# Patient Record
Sex: FEMALE | Race: WHITE
[De-identification: names, ages, dates, MRNs, and addresses within clinical notes are randomized per-mention and may not be internally consistent; named-entity substitution may affect disease eponyms.]

---

## 2020-07-17 ENCOUNTER — HOSPITAL ENCOUNTER (OUTPATIENT)
Dept: HOSPITAL 75 - ER | Age: 60
Setting detail: OBSERVATION
LOS: 1 days | Discharge: HOME | End: 2020-07-18
Attending: FAMILY MEDICINE | Admitting: FAMILY MEDICINE
Payer: COMMERCIAL

## 2020-07-17 VITALS — SYSTOLIC BLOOD PRESSURE: 174 MMHG | DIASTOLIC BLOOD PRESSURE: 97 MMHG

## 2020-07-17 VITALS — SYSTOLIC BLOOD PRESSURE: 150 MMHG | DIASTOLIC BLOOD PRESSURE: 95 MMHG

## 2020-07-17 VITALS — WEIGHT: 158.73 LBS | BODY MASS INDEX: 24.91 KG/M2 | HEIGHT: 66.93 IN

## 2020-07-17 VITALS — DIASTOLIC BLOOD PRESSURE: 88 MMHG | SYSTOLIC BLOOD PRESSURE: 146 MMHG

## 2020-07-17 DIAGNOSIS — I24.9: ICD-10-CM

## 2020-07-17 DIAGNOSIS — J18.9: ICD-10-CM

## 2020-07-17 DIAGNOSIS — Z20.828: ICD-10-CM

## 2020-07-17 DIAGNOSIS — R07.1: Primary | ICD-10-CM

## 2020-07-17 DIAGNOSIS — J44.9: ICD-10-CM

## 2020-07-17 DIAGNOSIS — I10: ICD-10-CM

## 2020-07-17 DIAGNOSIS — Z90.710: ICD-10-CM

## 2020-07-17 DIAGNOSIS — F17.210: ICD-10-CM

## 2020-07-17 DIAGNOSIS — Z79.51: ICD-10-CM

## 2020-07-17 LAB
ALBUMIN SERPL-MCNC: 4.4 GM/DL (ref 3.2–4.5)
ALP SERPL-CCNC: 102 U/L (ref 40–136)
ALT SERPL-CCNC: 23 U/L (ref 0–55)
APTT BLD: 28 SEC (ref 24–35)
BASOPHILS # BLD AUTO: 0 10^3/UL (ref 0–0.1)
BASOPHILS NFR BLD AUTO: 1 % (ref 0–10)
BILIRUB SERPL-MCNC: 0.6 MG/DL (ref 0.1–1)
BUN/CREAT SERPL: 16
CALCIUM SERPL-MCNC: 9.1 MG/DL (ref 8.5–10.1)
CHLORIDE SERPL-SCNC: 102 MMOL/L (ref 98–107)
CO2 SERPL-SCNC: 22 MMOL/L (ref 21–32)
CREAT SERPL-MCNC: 0.82 MG/DL (ref 0.6–1.3)
D DIMER PPP FEU-MCNC: 0.36 UG/ML (ref 0–0.49)
EOSINOPHIL # BLD AUTO: 0.3 10^3/UL (ref 0–0.3)
EOSINOPHIL NFR BLD AUTO: 5 % (ref 0–10)
ERYTHROCYTE [DISTWIDTH] IN BLOOD BY AUTOMATED COUNT: 13.4 % (ref 10–14.5)
GFR SERPLBLD BASED ON 1.73 SQ M-ARVRAT: > 60 ML/MIN
GLUCOSE SERPL-MCNC: 106 MG/DL (ref 70–105)
HCT VFR BLD CALC: 43 % (ref 35–52)
HGB BLD-MCNC: 15 G/DL (ref 11.5–16)
INR PPP: 0.9 (ref 0.8–1.4)
LYMPHOCYTES # BLD AUTO: 1.9 X 10^3 (ref 1–4)
LYMPHOCYTES NFR BLD AUTO: 30 % (ref 12–44)
MAGNESIUM SERPL-MCNC: 2.2 MG/DL (ref 1.6–2.4)
MANUAL DIFFERENTIAL PERFORMED BLD QL: NO
MCH RBC QN AUTO: 33 PG (ref 25–34)
MCHC RBC AUTO-ENTMCNC: 35 G/DL (ref 32–36)
MCV RBC AUTO: 95 FL (ref 80–99)
MONOCYTES # BLD AUTO: 0.8 X 10^3 (ref 0–1)
MONOCYTES NFR BLD AUTO: 12 % (ref 0–12)
NEUTROPHILS # BLD AUTO: 3.4 X 10^3 (ref 1.8–7.8)
NEUTROPHILS NFR BLD AUTO: 53 % (ref 42–75)
PLATELET # BLD: 258 10^3/UL (ref 130–400)
PMV BLD AUTO: 10.3 FL (ref 7.4–10.4)
POTASSIUM SERPL-SCNC: 4.2 MMOL/L (ref 3.6–5)
PROT SERPL-MCNC: 8.1 GM/DL (ref 6.4–8.2)
PROTHROMBIN TIME: 12.6 SEC (ref 12.2–14.7)
SODIUM SERPL-SCNC: 136 MMOL/L (ref 135–145)
WBC # BLD AUTO: 6.4 10^3/UL (ref 4.3–11)

## 2020-07-17 PROCEDURE — 36415 COLL VENOUS BLD VENIPUNCTURE: CPT

## 2020-07-17 PROCEDURE — 93041 RHYTHM ECG TRACING: CPT

## 2020-07-17 PROCEDURE — 93306 TTE W/DOPPLER COMPLETE: CPT

## 2020-07-17 PROCEDURE — 85379 FIBRIN DEGRADATION QUANT: CPT

## 2020-07-17 PROCEDURE — 87040 BLOOD CULTURE FOR BACTERIA: CPT

## 2020-07-17 PROCEDURE — 80053 COMPREHEN METABOLIC PANEL: CPT

## 2020-07-17 PROCEDURE — 93005 ELECTROCARDIOGRAM TRACING: CPT

## 2020-07-17 PROCEDURE — 94760 N-INVAS EAR/PLS OXIMETRY 1: CPT

## 2020-07-17 PROCEDURE — 99284 EMERGENCY DEPT VISIT MOD MDM: CPT

## 2020-07-17 PROCEDURE — 94664 DEMO&/EVAL PT USE INHALER: CPT

## 2020-07-17 PROCEDURE — 80061 LIPID PANEL: CPT

## 2020-07-17 PROCEDURE — 84484 ASSAY OF TROPONIN QUANT: CPT

## 2020-07-17 PROCEDURE — 83735 ASSAY OF MAGNESIUM: CPT

## 2020-07-17 PROCEDURE — 71046 X-RAY EXAM CHEST 2 VIEWS: CPT

## 2020-07-17 PROCEDURE — 71045 X-RAY EXAM CHEST 1 VIEW: CPT

## 2020-07-17 PROCEDURE — 83880 ASSAY OF NATRIURETIC PEPTIDE: CPT

## 2020-07-17 PROCEDURE — 82962 GLUCOSE BLOOD TEST: CPT

## 2020-07-17 PROCEDURE — 85730 THROMBOPLASTIN TIME PARTIAL: CPT

## 2020-07-17 PROCEDURE — 94640 AIRWAY INHALATION TREATMENT: CPT

## 2020-07-17 PROCEDURE — 85610 PROTHROMBIN TIME: CPT

## 2020-07-17 PROCEDURE — 85025 COMPLETE CBC W/AUTO DIFF WBC: CPT

## 2020-07-17 PROCEDURE — 87635 SARS-COV-2 COVID-19 AMP PRB: CPT

## 2020-07-17 PROCEDURE — 83874 ASSAY OF MYOGLOBIN: CPT

## 2020-07-17 RX ADMIN — NITROGLYCERIN PRN MG: 0.4 TABLET SUBLINGUAL at 10:51

## 2020-07-17 RX ADMIN — NITROGLYCERIN PRN MG: 0.4 TABLET SUBLINGUAL at 10:39

## 2020-07-17 RX ADMIN — ALBUTEROL SULFATE SCH PUFF: 90 AEROSOL, METERED RESPIRATORY (INHALATION) at 23:22

## 2020-07-17 NOTE — DIAGNOSTIC IMAGING REPORT
INDICATION: Shortness of breath and cough.



TIME OF EXAM: 10:45 AM



COMPARISON: No prior studies are available for comparison.



The heart size is normal. Pulmonary vascularity is normal. There

is minimal density in the right base. Possibility of minimal

infiltrate cannot be excluded. Left lung is clear. There is no

effusion. There is no pneumothorax.



IMPRESSION: There may be minimal infiltrate in the right base.

The study is otherwise unremarkable.



Dictated by: 



  Dictated on workstation # [id number]

## 2020-07-17 NOTE — HISTORY & PHYSICAL-HOSPITALIST
History of Present Illness


HPI/Chief Complaint


Pt is a 59yoCF with no known past medical history who presented to the ER due to

SOB, cough, and chest pain. She states that this started about 1 week ago. She 

has no sick contacts or exposure to COVID19. She denies fever. She has developed

a productive cough and has noticed herself wheezing. She does not have a formal 

diagnosis of COPD but has needed inhalers in the past. She is an active smoker 

but hasn't smoked for the past week due to SOB. She is also JAMES.


Source:  patient


Date Seen


20


Time Seen by a Provider:  14:09


Attending Physician


Pebbles Ramírez MD


PCP


No,Local Physician


Referring Physician





Date of Admission


2020 at 12:25





Home Medications & Allergies


Home Medications


Reviewed patient Home Medication Reconciliation performed by pharmacy medication

reconciliations technician and/or nursing.


Patients Allergies have been reviewed.





Allergies





Allergies


Coded Allergies


  No Known Drug Allergies (Unverified20)








Past Medical-Social-Family Hx


Past Med/Social Hx:  Reviewed Nursing Past Med/Soc Hx


Patient Social History


Alcohol Use:  Occasionally Uses


Recreational Drug Use:  No


Smoking Status:  Current Everyday Smoker


Recent Foreign Travel:  No


Contact w/other who traveled:  No


Recent Hopitalizations:  No


Recent Infectious Disease Expo:  No





Seasonal Allergies


Seasonal Allergies:  No





Past Medical History


Surgeries:  Hysterectomy





Family History


Reviewed Nursing Family Hx


No Pertinent Family Hx





Review of Systems


Constitutional:  No chills, No fever


EENTM:  no symptoms reported


Respiratory:  see HPI, cough, dyspnea on exertion, phlegm, short of breath


Cardiovascular:  chest pain; No edema, No Hx of Intervention, No palpitations


Gastrointestinal:  no symptoms reported


Genitourinary:  no symptoms reported


Musculoskeletal:  no symptoms reported


Skin:  no symptoms reported


Psychiatric/Neurological:  No Symptoms Reported





Physical Exam


Physical Exam


Vital Signs





Vital Signs - First Documented








 20





 10:03 14:00


 


Temp 36.7 


 


Pulse 99 


 


Resp 18 


 


B/P (MAP) 174/97 (122) 


 


Pulse Ox 97 


 


O2 Delivery Room Air 


 


FiO2  21





Capillary Refill : Less Than 3 Seconds


Height, Weight, BMI


Height: '"


Weight: lbs. oz. kg; 24.91 BMI


Method:


General Appearance:  No Apparent Distress, WD/WN


HEENT:  PERRL/EOMI, Moist Mucous Membranes; No Scleral Icterus (L), No Scleral 

Icterus (R)


Neck:  Normal Inspection, Supple; No Thyromegaly


Respiratory:  No No Accessory Muscle Use, No No Respiratory Distress; Crackles, 

Wheezing


Cardiovascular:  Regular Rate, Rhythm, No JVD, No Murmur


Gastrointestinal:  Normal Bowel Sounds, Non Tender, Soft


Extremity:  Normal Capillary Refill, No Calf Tenderness, No Pedal Edema


Neurologic/Psychiatric:  Alert, Oriented x3, Normal Mood/Affect


Skin:  Normal Color, Warm/Dry





Results


Results/Procedures


Labs


Laboratory Tests


20 10:20








Patient resulted labs reviewed.


Imaging:  Reviewed Imaging Report


Imaging


                 [facility], [place]





NAME:   BETY MIRELES


[facility] REC#:   [id number]


ACCOUNT#:   [id number]


PT STATUS:   REG ER


:   1960


PHYSICIAN:   SRIKANTH COX MD


ADMIT DATE:   20/ER


                                   ***Draft***


Date of Exam:20





CHEST 1 VIEW, AP/PA ONLY








INDICATION: Shortness of breath and cough.





TIME OF EXAM: 10:45 AM





COMPARISON: No prior studies are available for comparison.





The heart size is normal. Pulmonary vascularity is normal. There


is minimal density in the right base. Possibility of minimal


infiltrate cannot be excluded. Left lung is clear. There is no


effusion. There is no pneumothorax.





IMPRESSION: There may be minimal infiltrate in the right base.


The study is otherwise unremarkable.





  Dictated on workstation # [id number]








Dict:   20 1101


Trans:   20 1105


[facility] [id number]





Interpreted by:     CHRISTINA ELIZABETH MD


Electronically signed by:





Assessment/Plan


Admission Diagnosis


Chest pain


Admission Status:  Observation





Assessment and Plan


Chest pain


   Troponin negative on arrival as well as EKG


   Cardiology consulted, appreciate assistance


   Trend troponins


   ASA 





Likely COPD exacerbation


Pneumonia- community acquired


   On room air


   Start on Prednisone due to wheezing


   Rocephin and Azithro for CAP


   MAT protocol


   COVID pending


   


HTN


   Lisinopril started





Diagnosis/Problems


Diagnosis/Problems





(1) Smoker


Status:  Chronic


(2) COPD suggested by initial evaluation


Status:  Acute


(3) HTN (hypertension)


Status:  Acute


Qualifiers:  


   Hypertension type:  essential hypertension  Qualified Codes:  I10 - Essential

(primary) hypertension


(4) Chest pain


Status:  Acute


Qualifiers:  


   Chest pain type:  chest pain on breathing  Qualified Codes:  R07.1 - Chest pa

in on breathing


(5) Pneumonia


Status:  Acute


Qualifiers:  


   Pneumonia type:  due to unspecified organism  Laterality:  right  Lung 

location:  lower lobe of lung  Qualified Codes:  J18.1 - Lobar pneumonia, 

unspecified organism





Clinical Quality Measures


DVT/VTE Risk/Contraindication:


Risk Factor Score Per Nursing:  3


RFS Level Per Nursing on Admit:  3=High











PEBBLES RAMÍREZ MD              2020 14:09

## 2020-07-17 NOTE — ED CHEST PAIN
General


Chief Complaint:  Respiratory Problems


Stated Complaint:  SOB;COUGH


Nursing Triage Note:  


ARRIVED VIA AMB TO COVID.  COMPLAINS OF SOA, COUGH X1 WEEK.  ALSO STATES HER 


LEFT CHEST HURTS BUT THINKS IT IS RELATED TO THE COUGHING.


Nursing Sepsis Screen:  No Definite Risk


Source:  patient


Exam Limitations:  no limitations





History of Present Illness


Date Seen by Provider:  2020


Time Seen by Provider:  10:20


Initial Comments


Patient presents ER by private conveyance with chief complaint of the past week 

having some shortness of breath, nonproductive cough. She smokes about a pack 

cigarettes per day. She's been told the past at urgent care she may have COPD 

but she does not follow with a primary care doctor. She's also told she might 

have high blood pressure. No known history of heart disease. She's having some 

chest pain off and on in her left upper chest radiating through to her back. It 

radiates to her axilla but not to her arm or jaw. She has not taken any routine 

medications. She says her mom and sister both had heart disease early and her 

mom had to have a CABG in her early 50s. She's not having any fever or chills. 

No nausea vomiting or sweats.





Allergies and Home Medications


Allergies


Coded Allergies:  


     No Known Drug Allergies (Unverified , 20)





Home Medications


Ibuprofen 200 Mg Capsule, 800 MG PO Q8H PRN for PAIN-MILD (1-4), (Reported)





Patient Home Medication List


Home Medication List Reviewed:  Yes





Review of Systems


Review of Systems


Constitutional:  No chills, No diaphoresis


EENTM:  No Blurred Vision, No Double Vision


Respiratory:  Cough, Shortness of Air, SOA at Rest


Cardiovascular:  Chest Pain; Denies Edema, Denies Palpitations, Denies Syncope


Gastrointestinal:  Denies Constipated, Denies Diarrhea, Denies Nausea


Genitourinary:  Denies Burning, Denies Discharge


Musculoskeletal:  No back pain, No joint pain


Skin:  No pruritus, No rash


Psychiatric/Neurological:  Headache; Denies Numbness, Denies Paresthesia





All Other Systems Reviewed


Negative Unless Noted:  Yes





Past Medical-Social-Family Hx


Patient Social History


Alcohol Use:  Occasionally Uses


Recreational Drug Use:  No


Smoking Status:  Current Everyday Smoker


Recent Foreign Travel:  No


Contact w/Someone Who Travel:  No


Recent Infectious Disease Expo:  No


Recent Hopitalizations:  No





Seasonal Allergies


Seasonal Allergies:  No





Past Medical History


Surgeries:  Yes


Hysterectomy


Respiratory:  Yes (THINKS SHE HAS UNDIAGNOSED COPD/EMPHYSEMA)


Cardiac:  No


Neurological:  No


Genitourinary:  No


Gastrointestinal:  No


Endocrine:  No


HEENT:  No


Cancer:  No


Psychosocial:  No


Integumentary:  No





Physical Exam


Vital Signs





Vital Signs - First Documented








 20





 10:03


 


Temp 36.7


 


Pulse 99


 


Resp 18


 


B/P (MAP) 174/97 (122)


 


Pulse Ox 97


 


O2 Delivery Room Air





Capillary Refill : Less Than 3 Seconds


Height, Weight, BMI


Height: '"


Weight: lbs. oz. kg; 24.00 BMI


Method:


General Appearance:  WD/WN, Mild Distress


HEENT:  PERRL/EOMI, Pharynx Normal, Moist Mucous Membranes


Neck:  Full Range of Motion, Normal Inspection


Respiratory:  Lungs Clear, Normal Breath Sounds, No Accessory Muscle Use, No 

Respiratory Distress


Cardiovascular:  Regular Rate, Rhythm, No Edema, Normal Peripheral Pulses


Gastrointestinal:  Normal Bowel Sounds, Non Tender, Soft


Extremity:  Normal Capillary Refill, Normal Inspection


Neurologic/Psychiatric:  Alert, Oriented x3


Skin:  Normal Color, Warm/Dry





Focused Exam


Sepsis Stage:  Ruled Out


Reason for ruling out sepsis:  does not meet SIRS criteria.


Possible Source:  Pulmonary





Progress/Results/Core Measures


Results/Orders


Lab Results





Laboratory Tests








Test


 20


10:20 20


12:00 Range/Units


 


 


White Blood Count


 6.4 


 


 4.3-11.0


10^3/uL


 


Red Blood Count


 4.55 


 


 4.35-5.85


10^6/uL


 


Hemoglobin 15.0   11.5-16.0  G/DL


 


Hematocrit 43   35-52  %


 


Mean Corpuscular Volume 95   80-99  FL


 


Mean Corpuscular Hemoglobin 33   25-34  PG


 


Mean Corpuscular Hemoglobin


Concent 35 


 


 32-36  G/DL





 


Red Cell Distribution Width 13.4   10.0-14.5  %


 


Platelet Count


 258 


 


 130-400


10^3/uL


 


Mean Platelet Volume 10.3   7.4-10.4  FL


 


Neutrophils (%) (Auto) 53   42-75  %


 


Lymphocytes (%) (Auto) 30   12-44  %


 


Monocytes (%) (Auto) 12   0-12  %


 


Eosinophils (%) (Auto) 5   0-10  %


 


Basophils (%) (Auto) 1   0-10  %


 


Neutrophils # (Auto) 3.4   1.8-7.8  X 10^3


 


Lymphocytes # (Auto) 1.9   1.0-4.0  X 10^3


 


Monocytes # (Auto) 0.8   0.0-1.0  X 10^3


 


Eosinophils # (Auto)


 0.3 


 


 0.0-0.3


10^3/uL


 


Basophils # (Auto)


 0.0 


 


 0.0-0.1


10^3/uL


 


Prothrombin Time 12.6   12.2-14.7  SEC


 


INR Comment 0.9   0.8-1.4  


 


Activated Partial


Thromboplast Time 28 


 


 24-35  SEC





 


D-Dimer


 0.36 


 


 0.00-0.49


UG/ML


 


Sodium Level 136   135-145  MMOL/L


 


Potassium Level 4.2   3.6-5.0  MMOL/L


 


Chloride Level 102     MMOL/L


 


Carbon Dioxide Level 22   21-32  MMOL/L


 


Anion Gap 12   5-14  MMOL/L


 


Blood Urea Nitrogen 13   7-18  MG/DL


 


Creatinine


 0.82 


 


 0.60-1.30


MG/DL


 


Estimat Glomerular Filtration


Rate > 60 


 


  





 


BUN/Creatinine Ratio 16    


 


Glucose Level 106 H    MG/DL


 


Calcium Level 9.1   8.5-10.1  MG/DL


 


Corrected Calcium 8.8   8.5-10.1  MG/DL


 


Magnesium Level 2.2   1.6-2.4  MG/DL


 


Total Bilirubin 0.6   0.1-1.0  MG/DL


 


Aspartate Amino Transf


(AST/SGOT) 23 


 


 5-34  U/L





 


Alanine Aminotransferase


(ALT/SGPT) 23 


 


 0-55  U/L





 


Alkaline Phosphatase 102     U/L


 


Myoglobin


 47.1 


 


 10.0-92.0


NG/ML


 


Troponin I < 0.028   <0.028  NG/ML


 


B-Type Natriuretic Peptide 49.2   <100.0  PG/ML


 


Total Protein 8.1   6.4-8.2  GM/DL


 


Albumin 4.4   3.2-4.5  GM/DL








My Orders





Orders - SRIKANTH COX


Cbc With Automated Diff (20 10:31)


Magnesium (20 10:31)


Chest 1 View, Ap/Pa Only (20 10:31)


Ekg Tracing (20 10:31)


Comprehensive Metabolic Panel (20 10:31)


Myoglobin Serum (20 10:31)


Protime With Inr (20 10:31)


Partial Thromboplastin Time (20 10:31)


O2 (20 10:31)


Monitor-Rhythm Ecg Trace Only (20 10:31)


Lipid Panel (20 06:00)


Ed Iv/Invasive Line Start (20 10:31)


BNP (20 10:31)


Troponin I (20 10:31)


Nitroglycerin 0.4 Mg Btl 25's (Nitrostat (20 10:45)


Aspirin Chewable Tablet (Baby Aspirin Ch (20 10:45)


Coronavirus Sars-Cov-2 So  (20 10:31)


Fibrin Degradation Products (20 10:20)


Ceftriaxone  For Iv Use (Rocephin  For I (20 12:15)


Azithromycin Tablet (Zithromax Tablet) (20 12:15)


Blood Culture (20 12:10)





Medications Given in ED





Current Medications








 Medications  Dose


 Ordered  Sig/Vin


 Route  Start Time


 Stop Time Status Last Admin


Dose Admin


 


 Aspirin  324 mg  ONCE  ONCE


 PO  20 10:45


 20 10:46 DC 20 10:39


324 MG


 


 Azithromycin  500 mg  ONCE  ONCE


 PO  20 12:15


 20 12:16 DC 20 12:21


500 MG


 


 Ceftriaxone


 Sodium 1000 mg/


 Sterile Water  10 ml @ 


 200 mls/hr  ONCE  ONCE


 IV  20 12:15


 20 12:17 DC 20 12:20


200 MLS/HR


 


 Nitroglycerin  0.4 mg  UD  PRN


 SL  20 10:45


 20 13:02 DC 20 10:51


0.4 MG








Vital Signs/I&O











 20





 10:03


 


Temp 36.7


 


Pulse 99


 


Resp 18


 


B/P (MAP) 174/97 (122)


 


Pulse Ox 97


 


O2 Delivery Room Air














Blood Pressure Mean:                    122











Progress


Progress Note #1:  


   Time:  10:36


Progress Note


We'll trial aspirin and nitroglycerin. We'll get COVID-19 swab. Even she has a 

negative troponin should have 4 points and the heart score so we would recommend

observation. Presently she rates her pain as a 4 out of 10.


Progress Note #2:  


   Time:  12:01


Progress Note


After discussing the case with the patient she agrees to stay on observation to 

repeat delta troponins. We'll cover her with Rocephin and azithromycin and get 

some blood cultures. She is not septic.


Initial ECG Impression Date:  2020


Initial ECG Impression Time:  10:10


Initial ECG Rate:  89


Initial ECG Rhythm:  Normal Sinus


Initial ECG Intervals:  Normal


Initial ECG Impression:  Normal


Initial ECG Comparisson:  No Previous ECG Available


Comment


Normal sinus rhythm without relevant ST changes.





Diagnostic Imaging





   Diagonstic Imaging:  Xray


   Plain Films/CT/US/NM/MRI:  chest (1v)


Comments


NAME:   BETY MIRELES


[facility] REC#:   [id number]


ACCOUNT#:   [id number]


PT STATUS:   REG ER


:   1960


PHYSICIAN:   SRIKANTH COX MD


ADMIT DATE:   20/ER


                                   ***Draft***


Date of Exam:20





CHEST 1 VIEW, AP/PA ONLY








INDICATION: Shortness of breath and cough.





TIME OF EXAM: 10:45 AM





COMPARISON: No prior studies are available for comparison.





The heart size is normal. Pulmonary vascularity is normal. There


is minimal density in the right base. Possibility of minimal


infiltrate cannot be excluded. Left lung is clear. There is no


effusion. There is no pneumothorax.





IMPRESSION: There may be minimal infiltrate in the right base.


The study is otherwise unremarkable.





  Dictated on workstation # [id number]








Dict:   20 1101


Trans:   20 1105


[facility] [id number]





Interpreted by:     CHRISTINA ELIZABETH MD


Electronically signed by:


   Reviewed:  Reviewed by Me





Departure


Communication (Admissions)


Time/Spoke to Admitting Phy:  12:06


Discussed case with Dr. Ramírez. She agrees to observe the patient with cardiac 

consultation. She agrees with Rocephin and azithromycin.


Time/Spoke to Consulting Phy:  12:05


Discussed case with Dr. Galvan and he agrees to consult on the patient with 

serial troponins.





Impression





   Primary Impression:  


   Pneumonia


   Qualified Codes:  J18.1 - Lobar pneumonia, unspecified organism


   Additional Impressions:  


   Acute coronary syndrome


   COVID-19 PUI


Disposition:   ADMITTED AS INPATIENT


Condition:  Stable





Admissions


Decision to Admit Reason:  Admit from ER (General)


Decision to Admit/Date:  2020


Time/Decision to Admit Time:  11:29











SRIKANTH COX                 2020 10:37

## 2020-07-17 NOTE — NUR
BETY MIRELES S admitted to room 426-1, with an admitting diagnosis of , on 07/17/20 
from ED via , accompanied by STAFF. BETY MIRELES introduced to surroundings, call 
light, bed controls, phone, TV, temperature control, lights, meal times, smoking policy, 
visitor policy, side rail policy, bathrooms and showers.  Patient Rights given to patient in 
the handbook. BETY MIRELES verbalizes understanding that [facility] is not 
responsible for the loss or damage to any personal effects or valuables that are kept in the 
patients posession during their hospitalization.

## 2020-07-17 NOTE — NUR
SPOKE WITH THE PT (I CALLED THE PATIENTS ROOM PHONE) TO COMPLETE THE MED REC



PT DENIES TAKING ANY PRESCRIPTION MEDICATION



OTC MEDS:

IBUPROFEN 200MG PT SAYS SHE TAKES 4 TABS AT A TIME

## 2020-07-17 NOTE — NUR
PT STATES HER PAIN IS ABOUT THE SAME.  DESCRIBES THE PAIN IN HER LEFT CHEST AS 
JUST SORE.  /87.  DENIES NEEDS AT THIS TIME.  PT STATES SHE HAS BEEN 
TALKING TO HER FAMILY ON THE PHONE.

## 2020-07-17 NOTE — XMS REPORT
Continuity of Care Document

                             Created on: 2020



BETY MIRELES

External Reference #: [id number]

: 1960

Sex: Female



Demographics





                          Home Phone                [phone]

 

                          Preferred Language        Unknown

 

                          Marital Status            Unknown

 

                          Nondenominational Affiliation     Unknown

 

                          Race                      Unknown

 

                          Ethnic Group              Unknown





Author





                          Organization              Unknown

 

                          Address                   Unknown

 

                          Phone                     Unavailable



              



Allergies

      



There is no data.                  



Medications

      



There is no data.                  



Problems

      



There is no data.                  



Procedures

      



There is no data.                  



Results

      



                    Test                Result              Range        

 

                                        Complete blood count (CBC) with automate

d white blood cell (WBC) differential - 

20 10:20         

 

                          Blood leukocytes automated count (number/volume)      

     6.4 10*3/uL          

                                        4.3-11.0        

 

                          Blood erythrocytes automated count (number/volume)    

       4.55 10*6/uL       

                                        4.35-5.85        

 

                    Venous blood hemoglobin measurement (mass/volume)           

15.0 g/dL           

11.5-16.0        

 

                    Blood hematocrit (volume fraction)           43 %           

     35-52        

 

                    Automated erythrocyte mean corpuscular volume           95 [

foz_us]           

80-99        

 

                                        Automated erythrocyte mean corpuscular h

emoglobin (mass per erythrocyte)        

                          33 pg                     25-34        

 

                                        Automated erythrocyte mean corpuscular h

emoglobin concentration measurement 

(mass/volume)             35 g/dL                   32-36        

 

                    Automated erythrocyte distribution width ratio           13.

4 %              10.0-

14.5        

 

                    Automated blood platelet count (count/volume)           258 

10*3/uL           

130-400        

 

                          Automated blood platelet mean volume measurement      

     10.3 [foz_us]        

                                        7.4-10.4        

 

                    Automated blood neutrophils/100 leukocytes           53 %   

             42-75       

 

 

                    Automated blood lymphocytes/100 leukocytes           30 %   

             12-44       

 

 

                    Blood monocytes/100 leukocytes           12 %               

 0-12        

 

                    Automated blood eosinophils/100 leukocytes           5 %    

             0-10        

 

                    Automated blood basophils/100 leukocytes           1 %      

           0-10        

 

                    Blood neutrophils automated count (number/volume)           

3.4 10*3            

1.8-7.8        

 

                    Blood lymphocytes automated count (number/volume)           

1.9 10*3            

1.0-4.0        

 

                    Blood monocytes automated count (number/volume)           0.

8 10*3            

0.0-1.0        

 

                    Automated eosinophil count           0.3 10*3/uL           0

.0-0.3        

 

                    Automated blood basophil count (count/volume)           0.0 

10*3/uL           

0.0-0.1        

 

                                        PT panel in platelet poor plasma by coag

ulation assay - 20 10:20         

 

                          Prothrombin time (PT) in platelet poor plasma by coagu

lation assay           

12.6 s                                  12.2-14.7        

 

                          INR in platelet poor plasma or blood by coagulation as

say           0.9         

                                        0.8-1.4        

 

                                        Activated partial thromboplastin time (a

PTT) in platelet poor plasma 

bycoagulation assay - 20 10:20         

 

                                        Activated partial thromboplastin time (a

PTT) in platelet poor plasma 

bycoagulation assay           28 s                      24-35        

 

                                        Fibrin D-dimer FEU measurement in platel

et poor plasma (mass/volume) - 20 

10:20         

 

                          Fibrin D-dimer FEU measurement in platelet poor plasma

 (mass/volume)           

0.36 ug/mL                              0.00-0.49        

 

                                        Comprehensive metabolic panel - 20

 10:20         

 

                          Serum or plasma sodium measurement (moles/volume)     

      136 mmol/L          

                                        135-145        

 

                          Serum or plasma potassium measurement (moles/volume)  

         4.2 mmol/L       

                                        3.6-5.0        

 

                          Serum or plasma chloride measurement (moles/volume)   

        102 mmol/L        

                                                

 

                    Carbon dioxide           22 mmol/L           21-32        

 

                          Serum or plasma anion gap determination (moles/volume)

           12 mmol/L      

                                        5-14        

 

                          Serum or plasma urea nitrogen measurement (mass/volume

)           13 mg/dL      

                                        7-18        

 

                          Serum or plasma creatinine measurement (mass/volume)  

         0.82 mg/dL       

                                        0.60-1.30        

 

                    Serum or plasma urea nitrogen/creatinine mass ratio         

  16                  NRG 

       

 

                                        Serum or plasma creatinine measurement w

ith calculation of estimated glomerular 

filtration rate           >                         NRG        

 

                    Serum or plasma glucose measurement (mass/volume)           

106 mg/dL           

        

 

                    Serum or plasma calcium measurement (mass/volume)           

9.1 mg/dL           

8.5-10.1        

 

                          Serum or plasma total bilirubin measurement (mass/volu

me)           0.6 mg/dL   

                                        0.1-1.0        

 

                                        Serum or plasma alkaline phosphatase damir

surement (enzymatic activity/volume)    

                          102 U/L                           

 

                                        Serum or plasma aspartate aminotransfera

se measurement (enzymatic 

activity/volume)           23 U/L                    5-34        

 

                                        Serum or plasma alanine aminotransferase

 measurement (enzymatic activity/volume)

                          23 U/L                    0-55        

 

                    Serum or plasma protein measurement (mass/volume)           

8.1 g/dL            

6.4-8.2        

 

                    Serum or plasma albumin measurement (mass/volume)           

4.4 g/dL            

3.2-4.5        

 

                    CALCIUM CORRECTED           8.8 mg/dL           8.5-10.1    

    

 

                                        Magnesium - 20 10:20         

 

                    Magnesium           2.2 mg/dL           1.6-2.4        

 

                                        Myoglobin, serum - 20 10:20       

  

 

                    Myoglobin, serum           47.1 ng/mL           10.0-92.0   

     

 

                                        Serum or plasma troponin i.cardiac measu

rement (mass/volume) - 20 10:20   

      

 

                          Serum or plasma troponin i.cardiac measurement (mass/v

olume)           < ng/mL  

                                        <0.028        

 

                                        Serum or plasma lithium measurement (mol

es/volume) - 20 10:20         

 

                    BNP PT              49.2 pg/mL           <100.0        



                                



Encounters

      



                ACCT No.           Visit Date/Time           Discharge          

 Status         

             Pt. Type           Provider           Facility           Loc./Unit 

          

Complaint        

 

             [id number]           2020 10:54:00                         

            

Document Registration

## 2020-07-17 NOTE — XMS REPORT
Continuity of Care Document

                             Created on: 2020



BETY MIRELES

External Reference #: [id number]

: 1960

Sex: Female



Demographics





                          Home Phone                [phone]

 

                          Preferred Language        Unknown

 

                          Marital Status            Unknown

 

                          Confucianism Affiliation     Unknown

 

                          Race                      Unknown

 

                          Ethnic Group              Unknown





Author





                          Organization              Unknown

 

                          Address                   Unknown

 

                          Phone                     Unavailable



              



Allergies

      



There is no data.                  



Medications

      



There is no data.                  



Problems

      



There is no data.                  



Procedures

      



There is no data.                  



Results

      



                    Test                Result              Range        

 

                                        Complete blood count (CBC) with automate

d white blood cell (WBC) differential - 

20 10:20         

 

                          Blood leukocytes automated count (number/volume)      

     6.4 10*3/uL          

                                        4.3-11.0        

 

                          Blood erythrocytes automated count (number/volume)    

       4.55 10*6/uL       

                                        4.35-5.85        

 

                    Venous blood hemoglobin measurement (mass/volume)           

15.0 g/dL           

11.5-16.0        

 

                    Blood hematocrit (volume fraction)           43 %           

     35-52        

 

                    Automated erythrocyte mean corpuscular volume           95 [

foz_us]           

80-99        

 

                                        Automated erythrocyte mean corpuscular h

emoglobin (mass per erythrocyte)        

                          33 pg                     25-34        

 

                                        Automated erythrocyte mean corpuscular h

emoglobin concentration measurement 

(mass/volume)             35 g/dL                   32-36        

 

                    Automated erythrocyte distribution width ratio           13.

4 %              10.0-

14.5        

 

                    Automated blood platelet count (count/volume)           258 

10*3/uL           

130-400        

 

                          Automated blood platelet mean volume measurement      

     10.3 [foz_us]        

                                        7.4-10.4        

 

                    Automated blood neutrophils/100 leukocytes           53 %   

             42-75       

 

 

                    Automated blood lymphocytes/100 leukocytes           30 %   

             12-44       

 

 

                    Blood monocytes/100 leukocytes           12 %               

 0-12        

 

                    Automated blood eosinophils/100 leukocytes           5 %    

             0-10        

 

                    Automated blood basophils/100 leukocytes           1 %      

           0-10        

 

                    Blood neutrophils automated count (number/volume)           

3.4 10*3            

1.8-7.8        

 

                    Blood lymphocytes automated count (number/volume)           

1.9 10*3            

1.0-4.0        

 

                    Blood monocytes automated count (number/volume)           0.

8 10*3            

0.0-1.0        

 

                    Automated eosinophil count           0.3 10*3/uL           0

.0-0.3        

 

                    Automated blood basophil count (count/volume)           0.0 

10*3/uL           

0.0-0.1        

 

                                        PT panel in platelet poor plasma by coag

ulation assay - 20 10:20         

 

                          Prothrombin time (PT) in platelet poor plasma by coagu

lation assay           

12.6 s                                  12.2-14.7        

 

                          INR in platelet poor plasma or blood by coagulation as

say           0.9         

                                        0.8-1.4        

 

                                        Activated partial thromboplastin time (a

PTT) in platelet poor plasma 

bycoagulation assay - 20 10:20         

 

                                        Activated partial thromboplastin time (a

PTT) in platelet poor plasma 

bycoagulation assay           28 s                      24-35        

 

                                        Fibrin D-dimer FEU measurement in platel

et poor plasma (mass/volume) - 20 

10:20         

 

                          Fibrin D-dimer FEU measurement in platelet poor plasma

 (mass/volume)           

0.36 ug/mL                              0.00-0.49        

 

                                        Comprehensive metabolic panel - 20

 10:20         

 

                          Serum or plasma sodium measurement (moles/volume)     

      136 mmol/L          

                                        135-145        

 

                          Serum or plasma potassium measurement (moles/volume)  

         4.2 mmol/L       

                                        3.6-5.0        

 

                          Serum or plasma chloride measurement (moles/volume)   

        102 mmol/L        

                                                

 

                    Carbon dioxide           22 mmol/L           21-32        

 

                          Serum or plasma anion gap determination (moles/volume)

           12 mmol/L      

                                        5-14        

 

                          Serum or plasma urea nitrogen measurement (mass/volume

)           13 mg/dL      

                                        7-18        

 

                          Serum or plasma creatinine measurement (mass/volume)  

         0.82 mg/dL       

                                        0.60-1.30        

 

                    Serum or plasma urea nitrogen/creatinine mass ratio         

  16                  NRG 

       

 

                                        Serum or plasma creatinine measurement w

ith calculation of estimated glomerular 

filtration rate           >                         NRG        

 

                    Serum or plasma glucose measurement (mass/volume)           

106 mg/dL           

        

 

                    Serum or plasma calcium measurement (mass/volume)           

9.1 mg/dL           

8.5-10.1        

 

                          Serum or plasma total bilirubin measurement (mass/volu

me)           0.6 mg/dL   

                                        0.1-1.0        

 

                                        Serum or plasma alkaline phosphatase damir

surement (enzymatic activity/volume)    

                          102 U/L                           

 

                                        Serum or plasma aspartate aminotransfera

se measurement (enzymatic 

activity/volume)           23 U/L                    5-34        

 

                                        Serum or plasma alanine aminotransferase

 measurement (enzymatic activity/volume)

                          23 U/L                    0-55        

 

                    Serum or plasma protein measurement (mass/volume)           

8.1 g/dL            

6.4-8.2        

 

                    Serum or plasma albumin measurement (mass/volume)           

4.4 g/dL            

3.2-4.5        

 

                    CALCIUM CORRECTED           8.8 mg/dL           8.5-10.1    

    

 

                                        Magnesium - 20 10:20         

 

                    Magnesium           2.2 mg/dL           1.6-2.4        

 

                                        Myoglobin, serum - 20 10:20       

  

 

                    Myoglobin, serum           47.1 ng/mL           10.0-92.0   

     

 

                                        Serum or plasma troponin i.cardiac measu

rement (mass/volume) - 20 10:20   

      

 

                          Serum or plasma troponin i.cardiac measurement (mass/v

olume)           < ng/mL  

                                        <0.028        



                              



Encounters

      



                ACCT No.           Visit Date/Time           Discharge          

 Status         

             Pt. Type           Provider           Facility           Loc./Unit 

          

Complaint        

 

             [id number]           2020 10:54:00                         

            

Document Registration

## 2020-07-17 NOTE — XMS REPORT
[facility]

                             Created on: 2015



Lynnette Jean Baptiste

External Reference #: [id number]

: 1960

Sex: Female



Demographics





                          Address                   [address] [place]  [address]

 

                          Home Phone                [phone]

 

                          Preferred Language        Unknown

 

                          Marital Status            Unknown

 

                          Worship Affiliation     Unknown

 

                          Race                      White

 

                          Ethnic Group              Refused to Report





Author





                          Lynnette Goetz

 

                          Organization              [employer]

 

                          Address                   Unknown

 

                          Phone                     Unavailable







Care Team Providers





                    Care Team Member Name Role                Phone

 

                    CELIA TRINH    CP                  Unavailable



                                                                



Allergies

          No Known Allergies                                                    
                                   



Problems

          



             Problem Type Condition    Code         Onset Dates  Condition Statu

s

 

             Assessment   Encounter for immunization Z23                       A

ctive



                                                                                
       



Medications

          No Known Medications                                                  
                                     



Procedures

          



                Procedure       Coding System   Code            Date

 

                SINGLE IMMUNIZATION ADMIN CPT-4           78159           Oct 21

, 2015

 

                FLUARIX QUAD (3 & UP)-GSK- CPT-4           53254           O

ct 2015



                                                                                
       



Results

          No Known Results                                                      
                                 



Immunizations

          



                          Vaccine                   Administration Date

 

                          FLUARIX QUAD (3 & UP)-GSK-2015 Oct 21, 2015



                                                                    



Summary Purpose

          [employer] Submission

## 2020-07-17 NOTE — CONSULTATION-CARDIOLOGY
HPI-Cardiology


Cardiology Consultation:


Date of Consultation


7/17/20


Date of Admission





Attending Physician


Teodora Ramírez MD


Admitting Physician


No,Local Physician


Consulting Physician


VERENICE GALVAN MD





HPI:


Time Seen by a Provider:  13:30


Chief Complaint:


Chest pain


This is a 59-year-old lady who has history of active smoking who presents with 

chest pain, shortness of breath and nonproductive cough.  She has positive 

family history of premature CAD in the mother.  Questionable history of COPD 

however she does not have a primary care physician.  Likely hypertension as 

well.  However denies any other medical or cardiac history.  Chest pain off and 

on in the left upper chest radiating to the shoulder and to the back.  No 

exacerbating or relieving factors.  Occasionally reproducible.  She thinks it's 

a old muscle.  Mild to moderate intensity.  No other associated cardiac 

complaints.





Review of Systems-Cardiology


Review of Systems


Constitutional:  As described under HPI; No As described under HPI, No no 

symptoms reported, No chills, No fever, No lightheadedness


Eyes:  No As described under HPI, No no symptoms reported, No blindness, No 

blurred vision, No contact lenses, No drainage, No decreased acuity, No foreign 

body sensation, No pain, No vision change


Ears/Nose/Throat:  No As described under HPI, No no symptoms reported, No 

chronic hearing loss, No ear discharge, No ear pain, No nasal drainage, No 

ulcerations


Respiratory:  No no symptoms reported; As described under HPI; No As described 

under HPI; cough; No orthopnea; shortness of breath; No SOB with excertion


Cardiovascular:  No no symptoms reported; As described under HPI; No As 

described under HPI; chest pain; No edema, No irregular heart rate, No 

lightheadedness, No palpitations


Gastrointestinal:  No no symptoms reported, No As described under HPI, No 

abdomen distended, No abdominal pain, No blood streaked bowels, No constipation,

No diarrhea, No nausea, No vomiting, No stool coloration changes


Genitourinary:  No As described under HPI, No burning, No dysuria, No discharge,

No frequency, No flank pain, No hematuria, No urgency


Pregnant:  Yes


Pregnant:  No


Skin:  No rash, No skin related problems, No ulcerations


Psychiatric/Neurological:  No anxiety, No depression, No seizure, No focal 

weakness, No syncope


Hematologic:  No bleeding abnormalities





All Other Systems Reviewed


Negative Unless Noted:  Yes





PMH-Social-Family Hx


Patient Social History


Alcohol Use:  Occasionally Uses


Recreational Drug Use:  No


Smoking Status:  Current Everyday Smoker


Recent Foreign Travel:  No


Recent Infectious Disease Expo:  No





Past Medical History


PMH


As described under Assessment.





Allergies and Home Medications


Allergies


Coded Allergies:  


     No Known Drug Allergies (Unverified , 7/17/20)





Home Medications


No Active Prescriptions or Reported Meds





Patient Home Medication List


Home Medication List Reviewed:  Yes





Physical Exam-Cardiology


Physical Exam


Vital Signs/I&O











 7/17/20 7/17/20 7/17/20 7/17/20





 10:03 13:30 13:42 14:00


 


Temp 36.7   36.7


 


Pulse 99 81 87 99


 


Resp 18 16 22 


 


B/P (MAP) 174/97 (122) 154/99  


 


Pulse Ox 97 97 98 97


 


O2 Delivery Room Air Room Air Room Air 


 


FiO2    21


 


    





 7/17/20   





 14:28   


 


Pulse 91   





Capillary Refill : Less Than 3 Seconds


Constitutional:  AAO x 3


HEENT:  PERRL; No discharge; hearing is well preserved, oral hygience is good; 

No ulceration, No xanthelasmas are seen


Neck:  No carotid bruit; carotid pulses are 2 + bilaterally


Respiratory:  chest is bilaterally symmetric, lungs clear to auscultation


Cardiovascular:  regular rate-rhythm, S1 and S2; No diastolic murmur, No 

systolic murmur


Gastrointestinal:  soft, audible bowel sounds; No spleenomegaly


Rectal:  deferred


Extremities:  normal range of motion, non-tender, normal inspection; No 

clubbing, No cyanosis; no lower extremity edema bilateral; No significant edema


Neurologic/Psychiatric:  no motor/sensory deficits, alert, normal mood/affect, 

oriented x 3, power is 5/5 both on sides


Skin:  normal color, warm/dry; No rash, No ulcerations





Data Review


Labs


Laboratory Tests


7/17/20 10:20: 


White Blood Count 6.4, Red Blood Count 4.55, Hemoglobin 15.0, Hematocrit 43, 

Mean Corpuscular Volume 95, Mean Corpuscular Hemoglobin 33, Mean Corpuscular 

Hemoglobin Concent 35, Red Cell Distribution Width 13.4, Platelet Count 258, 

Mean Platelet Volume 10.3, Neutrophils (%) (Auto) 53, Lymphocytes (%) (Auto) 30,

Monocytes (%) (Auto) 12, Eosinophils (%) (Auto) 5, Basophils (%) (Auto) 1, 

Neutrophils # (Auto) 3.4, Lymphocytes # (Auto) 1.9, Monocytes # (Auto) 0.8, 

Eosinophils # (Auto) 0.3, Basophils # (Auto) 0.0, Prothrombin Time 12.6, INR 

Comment 0.9, Activated Partial Thromboplast Time 28, D-Dimer 0.36, Sodium Level 

136, Potassium Level 4.2, Chloride Level 102, Carbon Dioxide Level 22, Anion Gap

12, Blood Urea Nitrogen 13, Creatinine 0.82, Estimat Glomerular Filtration Rate 

> 60, BUN/Creatinine Ratio 16, Glucose Level 106H, Calcium Level 9.1, Corrected 

Calcium 8.8, Magnesium Level 2.2, Total Bilirubin 0.6, Aspartate Amino Transf 

(AST/SGOT) 23, Alanine Aminotransferase (ALT/SGPT) 23, Alkaline Phosphatase 102,

Myoglobin 47.1, Troponin I < 0.028, B-Type Natriuretic Peptide 49.2, Total 

Protein 8.1, Albumin 4.4


7/17/20 12:00: 








ECG Impression


ECG


Initial ECG Rhythm:  Normal Sinus, PVC


Initial ECG Impression:  Normal





A/P-Cardiology


Assessment/Admission Diagnosis


Chest pain,


Hypertension,


Active smoking,


PVCs,


COPD





Plan


Chest pain, initial EKG did not show any acute ST-T wave abnormalities.  First 

troponin is negative.  Serial troponin is recommended.  If positive troponin, 

will recommend coronary angiography.  If serial negative troponin, will consider

outpatient stress test.  Continue aspirin.  Echocardiogram when COVID results 

negative.





Hypertension, start lisinopril 10 mg daily.





Active smoking, smoking cessation was strongly recommended.





PVCs, a frequent PVCs, consider beta blockers.





COPD, no active wheezing.  Will defer to the primary team.








Thank you for your consultation. Please call me if you have any questions.








CHANELL Galvan MD, FACP, FACC, FSCAI, FHRS, CCDS


Interventional Cardiology


Cardiac Electrophysiology


Vascular Medicine and Endovascular Interventions





Clinical Quality Measures


DVT/VTE Risk/Contraindication:


Risk Factor Score Per Nursing:  3


RFS Level Per Nursing on Admit:  3=High











VERENICE GALVAN MD             Jul 17, 2020 14:36

## 2020-07-17 NOTE — NUR
RESTING IN BED.  DENIES NEEDS.  STATES SHE HAS BEEN IN CONTACT WITH HER FAMILY 
CONCERNING ADMISSION.

## 2020-07-18 VITALS — SYSTOLIC BLOOD PRESSURE: 124 MMHG | DIASTOLIC BLOOD PRESSURE: 80 MMHG

## 2020-07-18 VITALS — SYSTOLIC BLOOD PRESSURE: 117 MMHG | DIASTOLIC BLOOD PRESSURE: 70 MMHG

## 2020-07-18 VITALS — DIASTOLIC BLOOD PRESSURE: 70 MMHG | SYSTOLIC BLOOD PRESSURE: 117 MMHG

## 2020-07-18 VITALS — DIASTOLIC BLOOD PRESSURE: 83 MMHG | SYSTOLIC BLOOD PRESSURE: 131 MMHG

## 2020-07-18 VITALS — DIASTOLIC BLOOD PRESSURE: 87 MMHG | SYSTOLIC BLOOD PRESSURE: 125 MMHG

## 2020-07-18 LAB
ALBUMIN SERPL-MCNC: 4 GM/DL (ref 3.2–4.5)
ALP SERPL-CCNC: 89 U/L (ref 40–136)
ALT SERPL-CCNC: 20 U/L (ref 0–55)
BASOPHILS # BLD AUTO: 0 10^3/UL (ref 0–0.1)
BASOPHILS NFR BLD AUTO: 0 % (ref 0–10)
BILIRUB SERPL-MCNC: 0.5 MG/DL (ref 0.1–1)
BUN/CREAT SERPL: 19
CALCIUM SERPL-MCNC: 9.2 MG/DL (ref 8.5–10.1)
CHLORIDE SERPL-SCNC: 104 MMOL/L (ref 98–107)
CHOLEST SERPL-MCNC: 145 MG/DL (ref ?–200)
CO2 SERPL-SCNC: 21 MMOL/L (ref 21–32)
CREAT SERPL-MCNC: 0.79 MG/DL (ref 0.6–1.3)
EOSINOPHIL # BLD AUTO: 0.1 10^3/UL (ref 0–0.3)
EOSINOPHIL NFR BLD AUTO: 1 % (ref 0–10)
ERYTHROCYTE [DISTWIDTH] IN BLOOD BY AUTOMATED COUNT: 13.2 % (ref 10–14.5)
GFR SERPLBLD BASED ON 1.73 SQ M-ARVRAT: > 60 ML/MIN
GLUCOSE SERPL-MCNC: 102 MG/DL (ref 70–105)
HCT VFR BLD CALC: 43 % (ref 35–52)
HDLC SERPL-MCNC: 72 MG/DL (ref 40–60)
HGB BLD-MCNC: 14.5 G/DL (ref 11.5–16)
LYMPHOCYTES # BLD AUTO: 1.7 X 10^3 (ref 1–4)
LYMPHOCYTES NFR BLD AUTO: 23 % (ref 12–44)
MANUAL DIFFERENTIAL PERFORMED BLD QL: NO
MCH RBC QN AUTO: 32 PG (ref 25–34)
MCHC RBC AUTO-ENTMCNC: 34 G/DL (ref 32–36)
MCV RBC AUTO: 94 FL (ref 80–99)
MONOCYTES # BLD AUTO: 0.8 X 10^3 (ref 0–1)
MONOCYTES NFR BLD AUTO: 11 % (ref 0–12)
NEUTROPHILS # BLD AUTO: 4.9 X 10^3 (ref 1.8–7.8)
NEUTROPHILS NFR BLD AUTO: 66 % (ref 42–75)
PLATELET # BLD: 266 10^3/UL (ref 130–400)
PMV BLD AUTO: 10.5 FL (ref 7.4–10.4)
POTASSIUM SERPL-SCNC: 4.2 MMOL/L (ref 3.6–5)
PROT SERPL-MCNC: 7.6 GM/DL (ref 6.4–8.2)
SODIUM SERPL-SCNC: 138 MMOL/L (ref 135–145)
TRIGL SERPL-MCNC: 54 MG/DL (ref ?–150)
VLDLC SERPL CALC-MCNC: 11 MG/DL (ref 5–40)
WBC # BLD AUTO: 7.5 10^3/UL (ref 4.3–11)

## 2020-07-18 RX ADMIN — ALBUTEROL SULFATE SCH PUFF: 90 AEROSOL, METERED RESPIRATORY (INHALATION) at 07:48

## 2020-07-18 RX ADMIN — ALBUTEROL SULFATE SCH PUFF: 90 AEROSOL, METERED RESPIRATORY (INHALATION) at 03:28

## 2020-07-18 RX ADMIN — ALBUTEROL SULFATE SCH PUFF: 90 AEROSOL, METERED RESPIRATORY (INHALATION) at 03:27

## 2020-07-18 NOTE — DISCHARGE SUMMARY
Diagnosis/Chief Complaint


Date of Admission


Jul 17, 2020 at 12:25


Date of Discharge





Discharge Date:  Jul 18, 2020


Admission Diagnosis


Chest pain


Primary Care





Discharge Diagnosis





(1) Smoker


Status:  Chronic


(2) COPD suggested by initial evaluation


Status:  Acute


(3) HTN (hypertension)


Status:  Acute


(4) Chest pain


Status:  Acute


(5) Pneumonia


Status:  Acute





Discharge Summary


Discharge Physical Exam


Allergies:  


Coded Allergies:  


     No Known Drug Allergies (Unverified , 7/17/20)


Vitals & I&Os





Vital Signs








  Date Time  Temp Pulse Resp B/P (MAP) Pulse Ox O2 Delivery O2 Flow Rate FiO2


 


7/18/20 08:07 36.3 80 18 125/87 (100) 94 Room Air  


 


7/17/20 14:00        21








General Appearance:  No Apparent Distress, WD/WN


Respiratory:  No Accessory Muscle Use, No Respiratory Distress, Wheezing (scant-

end expiratory, much improved)


Cardiovascular:  Regular Rate, Rhythm, No Murmur


Neurologic/Psychiatric:  Alert, Oriented x3





Hospital Course


Pt was admitted due to shortness of breath and chest pain. On exam she had 

diffuse wheezing and hyperinflated lungs on chest x ray. With history of active 

smoking this was very suggestive fo COPD though no definitive diagnosis. She was

treated with steroids and CAP coverage for small infiltrate on CXR. She improved

and was requesting discharge on second day of admission. Troponins were trended 

and negative. Cardiology was consulted and recommended outpatient stress 

testing. She was discharged home in stable condition to follow up this hospital 

stay with Dr Figueroa.


Labs (last 24 hrs)


Laboratory Tests


7/17/20 10:20: 


White Blood Count 6.4, Red Blood Count 4.55, Hemoglobin 15.0, Hematocrit 43, 

Mean Corpuscular Volume 95, Mean Corpuscular Hemoglobin 33, Mean Corpuscular 

Hemoglobin Concent 35, Red Cell Distribution Width 13.4, Platelet Count 258, 

Mean Platelet Volume 10.3, Neutrophils (%) (Auto) 53, Lymphocytes (%) (Auto) 30,

Monocytes (%) (Auto) 12, Eosinophils (%) (Auto) 5, Basophils (%) (Auto) 1, 

Neutrophils # (Auto) 3.4, Lymphocytes # (Auto) 1.9, Monocytes # (Auto) 0.8, 

Eosinophils # (Auto) 0.3, Basophils # (Auto) 0.0, Prothrombin Time 12.6, INR 

Comment 0.9, Activated Partial Thromboplast Time 28, D-Dimer 0.36, Sodium Level 

136, Potassium Level 4.2, Chloride Level 102, Carbon Dioxide Level 22, Anion Gap

12, Blood Urea Nitrogen 13, Creatinine 0.82, Estimat Glomerular Filtration Rate 

> 60, BUN/Creatinine Ratio 16, Glucose Level 106H, Calcium Level 9.1, Corrected 

Calcium 8.8, Magnesium Level 2.2, Total Bilirubin 0.6, Aspartate Amino Transf 

(AST/SGOT) 23, Alanine Aminotransferase (ALT/SGPT) 23, Alkaline Phosphatase 102,

Myoglobin 47.1, Troponin I < 0.028, B-Type Natriuretic Peptide 49.2, Total 

Protein 8.1, Albumin 4.4


7/17/20 12:00: Coronavirus (COVID-19)(PCR) Negative


7/17/20 16:00: Troponin I < 0.028


7/17/20 17:37: Glucometer 109


7/17/20 20:25: Glucometer 136H


7/17/20 22:15: Troponin I < 0.028


7/18/20 05:33: 


White Blood Count 7.5, Red Blood Count 4.50, Hemoglobin 14.5, Hematocrit 43, Me

an Corpuscular Volume 94, Mean Corpuscular Hemoglobin 32, Mean Corpuscular 

Hemoglobin Concent 34, Red Cell Distribution Width 13.2, Platelet Count 266, 

Mean Platelet Volume 10.5H, Neutrophils (%) (Auto) 66, Lymphocytes (%) (Auto) 

23, Monocytes (%) (Auto) 11, Eosinophils (%) (Auto) 1, Basophils (%) (Auto) 0, 

Neutrophils # (Auto) 4.9, Lymphocytes # (Auto) 1.7, Monocytes # (Auto) 0.8, 

Eosinophils # (Auto) 0.1, Basophils # (Auto) 0.0, Sodium Level 138, Potassium 

Level 4.2, Chloride Level 104, Carbon Dioxide Level 21, Anion Gap 13, Blood Urea

Nitrogen 15, Creatinine 0.79, Estimat Glomerular Filtration Rate > 60, BUN/

Creatinine Ratio 19, Glucose Level 102, Calcium Level 9.2, Corrected Calcium 

9.2, Total Bilirubin 0.5, Aspartate Amino Transf (AST/SGOT) 19, Alanine 

Aminotransferase (ALT/SGPT) 20, Alkaline Phosphatase 89, Total Protein 7.6, 

Albumin 4.0, Triglycerides Level 54, Cholesterol Level 145, LDL Cholesterol 

Direct 43, VLDL Cholesterol 11, HDL Cholesterol 72H


Patient resulted labs reviewed.


Pending Labs


Laboratory Tests


7/18/20 05:33: 


White Blood Count 7.5, Red Blood Count 4.50, Hemoglobin 14.5, Hematocrit 43, 

Mean Corpuscular Volume 94, Mean Corpuscular Hemoglobin 32, Mean Corpuscular 

Hemoglobin Concent 34, Red Cell Distribution Width 13.2, Platelet Count 266, 

Mean Platelet Volume 10.5, Neutrophils (%) (Auto) 66, Lymphocytes (%) (Auto) 23,

Monocytes (%) (Auto) 11, Eosinophils (%) (Auto) 1, Basophils (%) (Auto) 0, 

Neutrophils # (Auto) 4.9, Lymphocytes # (Auto) 1.7, Monocytes # (Auto) 0.8, 

Eosinophils # (Auto) 0.1, Basophils # (Auto) 0.0, Sodium Level 138, Potassium 

Level 4.2, Chloride Level 104, Carbon Dioxide Level 21, Anion Gap 13, Blood Urea

Nitrogen 15, Creatinine 0.79, Estimat Glomerular Filtration Rate > 60, 

BUN/Creatinine Ratio 19, Glucose Level 102, Calcium Level 9.2, Corrected Calcium

9.2, Total Bilirubin 0.5, Aspartate Amino Transf (AST/SGOT) 19, Alanine 

Aminotransferase (ALT/SGPT) 20, Alkaline Phosphatase 89, Total Protein 7.6, 

Albumin 4.0, Triglycerides Level 54, Cholesterol Level 145, LDL Cholesterol 

Direct 43, VLDL Cholesterol 11, HDL Cholesterol 72





Imaging:  Reviewed Imaging Report





Discussion & Recommendations


Discharge Planning:  >30 minutes discharge planning





Discharge


Home Medications:





Active Scripts


Active


Ventolin Hfa (Albuterol Sulfate) 1 Puff Puff 2 Puff INH Q4H PRN


     1 PUFF = 90 MCG


Keflex (Cephalexin) 500 Mg Capsule 500 Mg PO BID


Prednisone 20 Mg Tab 40 Mg PO DAILY@0700


     2 tabs (40mg) per day


Azithromycin 250 Mg Tablet 250 Mg PO DAILY@1200


Lisinopril 10 Mg Tablet 10 Mg PO DAILY


Reported


Ibuprofen 200 Mg Capsule 800 Mg PO Q8H PRN





Instructions to patient/family


Please see electronic discharge instructions given to patient.





Clinical Quality Measures


DVT/VTE Risk/Contraindication:


Risk Factor Score Per Nursing:  3


RFS Level Per Nursing on Admit:  3=High





Problem Qualifiers





(1) HTN (hypertension):  


Hypertension type:  essential hypertension  Qualified Codes:  I10 - Essential 

(primary) hypertension


(2) Chest pain:  


Chest pain type:  chest pain on breathing  Qualified Codes:  R07.1 - Chest pain 

on breathing


(3) Pneumonia:  


Pneumonia type:  due to unspecified organism  Laterality:  right  Lung location:

 lower lobe of lung  Qualified Codes:  J18.1 - Lobar pneumonia, unspecified 

organism








PEBBLES PHAM MD              Jul 18, 2020 09:25

## 2020-07-18 NOTE — DIAGNOSTIC IMAGING REPORT
INDICATION:  Pneumonia on admission.



TECHNIQUE:  Two view chest   8:46 AM



CORRELATION STUDY:   07/17/2020



FINDINGS: 

The heart size, mediastinal configuration and pulmonary

vasculature are within normal limits.  

Lung fields are somewhat hyperinflated. Increased density in lung

bases are present, largely overlapping summation shadow. No

definitive consolidating infiltrate. No effusion.  

Degenerative changes with bridging osteophytes thoracic spine.



IMPRESSION: 

1. No definitive pulmonary infiltrate at followup assessment.

Lung fields are somewhat hyperinflated.



Dictated by: 



  Dictated on workstation # [id number]

## 2020-07-18 NOTE — NUR
FOLLOW UP APPOINTMENTS GIVEN TO PT, SHE WAS INSTRUCTED TO CALL HER PCP MONDAY TO SCHEDULE AN 
APPOINTMENT FOR 1 WEEK AND TO FOLLOW UP WITH DR COATS IN 2 WEEKS.  F/U FAX SENT TO DR. COATS'S OFFICE FOR STRESS TEST AT EARLIEST OPENING.  PT'S PRESCRIPTIONS WERE SENT TO 
PHARMACY AND SHE IS AWARE THEY ARE READY FOR .

## 2020-07-18 NOTE — CARDIOLOGY PROGRESS NOTE
Cardiology SOAP Progress Note


Subjective:


No further chest pain.





Objective:


I&O/Vital Signs











 7/18/20 7/18/20 7/18/20 7/18/20





 04:26 07:27 07:48 08:00


 


Temp 37.2   


 


Pulse 90 76  


 


Resp 18   


 


B/P (MAP) 124/80 (95)   


 


Pulse Ox 94  97 


 


O2 Delivery Room Air  Room Air Room Air


 


    





 7/18/20 7/18/20 7/18/20 





 08:07 11:52 12:55 


 


Temp 36.3  36.3 


 


Pulse 80 90 90 


 


Resp 18 16 16 


 


B/P (MAP) 125/87 (100) 117/70 (86) 117/70 


 


Pulse Ox 94 94 94 


 


O2 Delivery Room Air Room Air Room Air 














 7/18/20





 00:00


 


Intake Total 710 ml


 


Balance 710 ml








Constitutional:  AAO x 3


Respiratory:  chest is bilaterally symmetric, lungs clear to auscultation


Cardiovascular:  regular rate-rhythm, S1 and S2; No diastolic murmur, No 

systolic murmur


Gastrointestional:  soft, audible bowel sounds; No spleenomegaly


Extremities:  normal range of motion, non-tender, normal inspection; No 

clubbing, No cyanosis; no lower extremity edema bilateral; No significant edema


Neurologic/Psychiatric:  no motor/sensory deficits, alert, normal mood/affect, 

oriented x 3, power is 5/5 both on sides


Skin:  normal color, warm/dry; No rash, No ulcerations





Results/Procedures:


Labs


Laboratory Tests


7/17/20 16:00: Troponin I < 0.028


7/17/20 17:37: Glucometer 109


7/17/20 20:25: Glucometer 136H


7/17/20 22:15: Troponin I < 0.028


7/18/20 05:33: 


White Blood Count 7.5, Red Blood Count 4.50, Hemoglobin 14.5, Hematocrit 43, 

Mean Corpuscular Volume 94, Mean Corpuscular Hemoglobin 32, Mean Corpuscular 

Hemoglobin Concent 34, Red Cell Distribution Width 13.2, Platelet Count 266, 

Mean Platelet Volume 10.5H, Neutrophils (%) (Auto) 66, Lymphocytes (%) (Auto) 

23, Monocytes (%) (Auto) 11, Eosinophils (%) (Auto) 1, Basophils (%) (Auto) 0, 

Neutrophils # (Auto) 4.9, Lymphocytes # (Auto) 1.7, Monocytes # (Auto) 0.8, 

Eosinophils # (Auto) 0.1, Basophils # (Auto) 0.0, Sodium Level 138, Potassium 

Level 4.2, Chloride Level 104, Carbon Dioxide Level 21, Anion Gap 13, Blood Urea

Nitrogen 15, Creatinine 0.79, Estimat Glomerular Filtration Rate > 60, 

BUN/Creatinine Ratio 19, Glucose Level 102, Calcium Level 9.2, Corrected Calcium

9.2, Total Bilirubin 0.5, Aspartate Amino Transf (AST/SGOT) 19, Alanine 

Aminotransferase (ALT/SGPT) 20, Alkaline Phosphatase 89, Total Protein 7.6, 

Albumin 4.0, Triglycerides Level 54, Cholesterol Level 145, LDL Cholesterol 

Direct 43, VLDL Cholesterol 11, HDL Cholesterol 72H


7/18/20 11:28: Glucometer 98





Microbiology


7/17/20 Blood Culture - Preliminary, Resulted


          No growth








A/P:


Assessment/Dx:


Chest pain,


Hypertension,


Active smoking,


PVCs,


COPD


Plan:


Chest pain, initial EKG did not show any acute ST-T wave abnormalities.  Serial 

troponin negative.  Will recommend outpatient nuclear stress test.  

Echocardiogram showed normal LV function.





Hypertension, start lisinopril 10 mg daily.





Active smoking, smoking cessation was strongly recommended.





PVCs, a frequent PVCs, consider beta blockers.





COPD, no active wheezing.  Will defer to the primary team.








Thank you for your consultation. Please call me if you have any questions.








CHANELL Galvan MD, FACP, FACC, FSCAI, FHRS, CCDS


Interventional Cardiology


Cardiac Electrophysiology


Vascular Medicine and Endovascular Interventions











VERENICE GALVAN MD             Jul 18, 2020 14:53

## 2020-07-18 NOTE — NUR
BETY MIRELES S demonstrates understanding of discharge instructions and accurately 
returns instructions upon questioning.  Copy of Post-Discharge Instructions and Medication 
Discharge Instructions given to PT. BETY MIRELES is able to manage continuing needs 
after discharge.  Patients belongings returned to PT. Skin dry and intact; no breakdown 
noted. Patient discharged from [facility] on 07/18/20 at 1305. BETY MIRELES left floor via 
WC, accompanied by STAFF.

## 2020-08-05 ENCOUNTER — HOSPITAL ENCOUNTER (OUTPATIENT)
Dept: HOSPITAL 75 - RT | Age: 60
End: 2020-08-05
Attending: FAMILY MEDICINE
Payer: COMMERCIAL

## 2020-08-05 DIAGNOSIS — J98.8: Primary | ICD-10-CM

## 2020-08-05 DIAGNOSIS — F17.210: ICD-10-CM

## 2020-08-05 PROCEDURE — 94060 EVALUATION OF WHEEZING: CPT

## 2020-08-05 PROCEDURE — 94729 DIFFUSING CAPACITY: CPT

## 2020-08-05 PROCEDURE — 94726 PLETHYSMOGRAPHY LUNG VOLUMES: CPT

## 2020-08-13 ENCOUNTER — HOSPITAL ENCOUNTER (OUTPATIENT)
Dept: HOSPITAL 75 - CARD | Age: 60
End: 2020-08-13
Attending: INTERNAL MEDICINE
Payer: COMMERCIAL

## 2020-08-13 VITALS — HEIGHT: 65.75 IN | BODY MASS INDEX: 26.18 KG/M2 | WEIGHT: 160.94 LBS

## 2020-08-13 VITALS — SYSTOLIC BLOOD PRESSURE: 147 MMHG | DIASTOLIC BLOOD PRESSURE: 88 MMHG

## 2020-08-13 DIAGNOSIS — Z20.828: ICD-10-CM

## 2020-08-13 DIAGNOSIS — R07.9: Primary | ICD-10-CM

## 2020-08-13 PROCEDURE — 78452 HT MUSCLE IMAGE SPECT MULT: CPT

## 2020-08-13 PROCEDURE — 93017 CV STRESS TEST TRACING ONLY: CPT

## 2020-08-13 RX ADMIN — Medication PRN ML: at 07:40

## 2020-08-13 RX ADMIN — Medication PRN ML: at 08:56

## 2020-08-13 NOTE — CARDIOLOGY STRESS TEST REPORT
Stress Test Report


Type of NM Stress Test:


Test Type:  LEXISCAN 0.4MG/5ML





Date of Procedure/Referring:


Date of Procedure:  Aug 13, 2020


PCP


VERENICE Galvan MD


Admitting Physician


No,Local Physician





Indications:


Chest pain





Baseline Heart Rate:


77





Baseline Blood Pressure:


Blood Pressure Systolic:  147


Blood Pressure Diastolic:  88





Baseline EKG:


Baseline EKG:  Sinus rhythm





Summary & Conclusion:


Summary:


 


The patient was brought to the stress lab after informed consent was taken.  St

ress test was performed according to the Lexiscan protocol.  0.4 mg of IV 

Lexiscan was given.  Low-grade exercise was performed.  Baseline EKG showed 

sinus rhythm at 77 BPM.  Initial blood pressure was 163/93 mmHg.  Maximum heart 

rate was 95 bpm and blood pressure 147/90 mmHg.  Patient did not have any chest 

pain, arrhythmias or ST segment changes during the stress test.


 


9.67 mCi of Myoview were given for rest imaging and 31.0 mCi of Myoview given 

for stress imaging.  Transient ischemic dilatation score 1.07, EF 57 percent.  

Normal wall motion.  Normal myocardial perfusion imaging during rest and stress.


 


Conclusion:


 


Pharmacological stress test was negative for ischemia.


Normal LV function with no wall motion abnormalities.


Normal myocardial perfusion imaging during rest and stress.











VERENICE GALVAN MD             Aug 13, 2020 17:34

## 2021-06-11 ENCOUNTER — HOSPITAL ENCOUNTER (OUTPATIENT)
Dept: HOSPITAL 75 - RAD | Age: 61
End: 2021-06-11
Attending: FAMILY MEDICINE
Payer: COMMERCIAL

## 2021-06-11 DIAGNOSIS — M17.12: Primary | ICD-10-CM

## 2021-06-11 PROCEDURE — 73562 X-RAY EXAM OF KNEE 3: CPT

## 2021-06-11 NOTE — DIAGNOSTIC IMAGING REPORT
INDICATION: Left knee pain and swelling



COMPARISON: None available



TECHNIQUE: 3 radiographs of the left knee dated 06/11/2021



FINDINGS: No acute fracture or dislocation. No destructive

osseous process. Moderate medial joint space narrowing and mild

lateral joint space narrowing. Moderate osteophytosis within the

medial compartment with mild osteophytosis within the lateral

compartment. Osteophytosis involving the patellofemoral joint,

moderate. No significant joint effusion. No suspicious radiopaque

foreign body.



IMPRESSION: No acute osseous abnormality with moderate

degenerative changes, greatest within the medial compartment.



Dictated by: 



  Dictated on workstation # [id number]